# Patient Record
Sex: FEMALE | Race: OTHER
[De-identification: names, ages, dates, MRNs, and addresses within clinical notes are randomized per-mention and may not be internally consistent; named-entity substitution may affect disease eponyms.]

---

## 2019-04-27 ENCOUNTER — HOSPITAL ENCOUNTER (EMERGENCY)
Dept: HOSPITAL 25 - ED | Age: 19
Discharge: HOME | End: 2019-04-27
Payer: COMMERCIAL

## 2019-04-27 VITALS — DIASTOLIC BLOOD PRESSURE: 80 MMHG | SYSTOLIC BLOOD PRESSURE: 122 MMHG

## 2019-04-27 DIAGNOSIS — S93.601A: Primary | ICD-10-CM
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
## 2019-04-27 PROCEDURE — 99283 EMERGENCY DEPT VISIT LOW MDM: CPT

## 2019-04-27 NOTE — ED
Lower Extremity





- HPI Summary


HPI Summary: 


Patient is a 18-year-old female presenting to the ED with a right foot injury.  

She states last evening, approximately 5 hours ago, she stumbled and fell 

forward over her foot.  She denies any inversion or E version of the ankle and 

denies any ankle pain.  Pain is most notably over the dorsal aspect of the 

right foot without ecchymosis, swelling or erythema.  She has never injured the 

foot in the past.  She denies any other complaints at this time.  Pain is 

currently rated a 9/10, worse with plantarflexion and dorsiflexion, better with 

rest.  She has not taken anything prior to arrival for pain control.  Patient 

endorses alcohol use.








- History of Current Complaint


Chief Complaint: EDExtremityLower


Stated Complaint: "RT FOOT INJURY" PER PT


Time Seen by Provider: 04/27/19 05:42


Hx Obtained From: Patient


Mechanism Of Injury: Twisted


Onset of Pain: Hours


Onset/Duration: Hours


Severity Initially: Severe


Severity Currently: Moderate


Pain Intensity: 9


Timing: Constant


Location: Is Discrete @ - right dorsal foot pain


Character Of Pain: Aching


Associated Signs And Symptoms: Negative: Swelling, Redness, Bruising


Aggravating Factor(s): Standing, Ambulation


Alleviating Factor(s): Rest


Able to Bear Weight: No





- Allergies/Home Medications


Allergies/Adverse Reactions: 


 Allergies











Allergy/AdvReac Type Severity Reaction Status Date / Time


 


No Known Allergies Allergy   Verified 04/27/19 05:20














PMH/Surg Hx/FS Hx/Imm Hx


Previously Healthy: Yes





- Immunization History


Hx Pertussis Vaccination: No


Immunizations Up to Date: Yes


Infectious Disease History: No


Infectious Disease History: 


   Denies: Traveled Outside the [place] in Last 30 Days





- Social History


Occupation: Unemployed, Student


Lives: With Family


Alcohol Use: Occasionally


Hx Substance Use: No


Substance Use Type: Reports: None


Hx Tobacco Use: No


Smoking Status (MU): Never Smoked Tobacco





Review of Systems


Constitutional: Negative


Negative: Fever, Chills, Fatigue, Skin Diaphoresis


Negative: Palpitations, Chest Pain


Negative: Shortness Of Breath, Cough


Genitourinary: Negative


Positive: no symptoms reported, see HPI


Positive: Arthralgia - right dorsal foot pain s/p fall


Negative: Rash, Bruising


Neurological: Negative


All Other Systems Reviewed And Are Negative: Yes





Physical Exam


Triage Information Reviewed: Yes


Vital Signs On Initial Exam: 


 Initial Vitals











Temp Pulse Resp BP Pulse Ox


 


 98.4 F   110   20   140/85   100 


 


 04/27/19 05:05  04/27/19 05:05  04/27/19 05:05  04/27/19 05:05  04/27/19 05:05











Vital Signs Reviewed: Yes


Appearance: Positive: Well-Appearing, No Pain Distress


Skin: Positive: Warm


Head/Face: Positive: Temporal Artery Tenderness


Eyes: Positive: EOMI, Conjunctiva Clear


Neck: Positive: Supple, No Lymphadenopathy


Respiratory/Lung Sounds: Positive: Clear to Auscultation, Breath Sounds Present


Cardiovascular: Positive: RRR


Musculoskeletal: Positive: Other - right dorsal foot pain


Neurological: Positive: Sensory/Motor Intact, Alert, Oriented to Person Place, 

Time, Speech Normal


Psychiatric: Positive: Other - crying on exam





Diagnostics





- Vital Signs


 Vital Signs











  Temp Pulse Resp BP Pulse Ox


 


 04/27/19 06:12   94   123/90  100


 


 04/27/19 06:00   94    85


 


 04/27/19 05:42   104   124/88  100


 


 04/27/19 05:12   97   140/85  100


 


 04/27/19 05:11   103    100


 


 04/27/19 05:05  98.4 F  110  20  140/85  100














- Laboratory


Lab Statement: Any lab studies that have been ordered have been reviewed, and 

results considered in the medical decision making process.





Lower Extremity Course/Dx





- Course


Course Of Treatment: During his course of treatment, the patient's evaluated 

for right dorsal foot pain.  X-rays obtained which showed no acute findings.  

These are read by RUSTAM Cruz in the emergency room.  We will await for 

overnight read.  Crutches given.  Ace bandage to the area.  Pulses +2 intact 

bilaterally.  No ecchymosis or swelling identified.  Patient is given ibuprofen 

in the ED.  Tylenol and ibuprofen sent to pharmacy.





- Diagnoses


Differential Diagnosis/HQI/PQRI: Positive: Fracture (Closed), Sprain, Strain


Provider Diagnoses: 


 Foot sprain








Discharge





- Sign-Out/Discharge


Documenting (check all that apply): Patient Departure


Patient Received Moderate/Deep Sedation with Procedure: No





- Discharge Plan


Condition: Stable


Disposition: HOME


Prescriptions: 


Acetaminophen TAB* [Tylenol TAB*] 650 mg PO Q6H PRN #20 tab


 PRN Reason: Pain


Ibuprofen 600 mg PO TID PRN #30 tablet MDD 3


 PRN Reason: Pain


Patient Education Materials:  Foot Sprain (ED)


Referrals: 


No Primary Care Phys,NOPCP [Primary Care Provider] - 


Additional Instructions: 


Use crutches as needed


Keep the area Ace bandaged 3-4 days for stability


Ibuprofen 600 mg 3 times daily 3 days








- Billing Disposition and Condition


Condition: STABLE


Disposition: Home